# Patient Record
Sex: FEMALE | Employment: UNEMPLOYED
[De-identification: names, ages, dates, MRNs, and addresses within clinical notes are randomized per-mention and may not be internally consistent; named-entity substitution may affect disease eponyms.]

---

## 2022-04-14 ENCOUNTER — NURSE TRIAGE (OUTPATIENT)
Dept: OTHER | Facility: CLINIC | Age: 52
End: 2022-04-14

## 2022-04-14 NOTE — TELEPHONE ENCOUNTER
Received call from Kevin Gage at [facility] with Red Flag Complaint. Subjective: Caller states \"I had a tick bite on my right ear and now I am having symptoms that I think are related. Two weeks ago my right heel was giving me problems to where I had difficulty walking. My knees are twice the size and my elbows are the same. My lymph nodes in my neck have been swollen for about a week. My joints are very painful. My shoulders, wrist, hands, and fingers are very sore. \"     Current Symptoms: swollen and painful joints, swollen lymph nodes    Onset: 2 weeks ago; worsening    Associated Symptoms: NA    Pain Severity: 10/10; sharp; constant    Temperature: none    What has been tried: Tylenol helped a little    LMP: NA Pregnant: NA    Recommended disposition: See in Office Today or Tomorrow    Care advice provided, patient verbalizes understanding; denies any other questions or concerns; instructed to call back for any new or worsening symptoms. Patient/Caller agrees with recommended disposition; writer provided warm transfer to [employer] [facility] at [facility] for appointment scheduling    Attention Provider: Thank you for allowing me to participate in the care of your patient. The patient was connected to triage in response to information provided to the [facility]. Please do not respond through this encounter as the response is not directed to a shared pool.         Reason for Disposition   Red ring or bull's-eye rash occurs around a deer tick bite    Protocols used: TICK BITE-ADULT-[place]

## 2022-04-27 ENCOUNTER — OFFICE VISIT (OUTPATIENT)
Dept: FAMILY MEDICINE CLINIC | Age: 52
End: 2022-04-27
Payer: MEDICAID

## 2022-04-27 ENCOUNTER — TELEPHONE (OUTPATIENT)
Dept: FAMILY MEDICINE CLINIC | Age: 52
End: 2022-04-27

## 2022-04-27 VITALS
WEIGHT: 185.2 LBS | BODY MASS INDEX: 25.93 KG/M2 | RESPIRATION RATE: 20 BRPM | DIASTOLIC BLOOD PRESSURE: 106 MMHG | SYSTOLIC BLOOD PRESSURE: 153 MMHG | HEART RATE: 80 BPM | HEIGHT: 71 IN | OXYGEN SATURATION: 98 % | TEMPERATURE: 98.6 F

## 2022-04-27 DIAGNOSIS — R03.0 ELEVATED BLOOD-PRESSURE READING WITHOUT DIAGNOSIS OF HYPERTENSION: ICD-10-CM

## 2022-04-27 DIAGNOSIS — R60.0 LOCALIZED EDEMA: ICD-10-CM

## 2022-04-27 DIAGNOSIS — M79.10 MYALGIA: Primary | ICD-10-CM
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
## 2022-04-27 PROCEDURE — 99204 OFFICE O/P NEW MOD 45 MIN: CPT | Performed by: FAMILY MEDICINE

## 2022-04-27 RX ORDER — CHOLECALCIFEROL (VITAMIN D3) 50 MCG
1200 CAPSULE ORAL
COMMUNITY

## 2022-04-27 RX ORDER — DOXYCYCLINE 100 MG/1
100 TABLET ORAL 2 TIMES DAILY
Qty: 28 TABLET | Refills: 0 | Status: SHIPPED | OUTPATIENT
Start: 2022-04-27 | End: 2022-05-11

## 2022-04-27 RX ORDER — GLUCOSAMINE/CHONDR SU A SOD 167-133 MG
500 CAPSULE ORAL
COMMUNITY

## 2022-04-27 NOTE — PROGRESS NOTES
[facility]    History of Present Illness:   Kaitlin Ramos is a 54 y.o. female here for   Chief Complaint   Patient presents with   [facility] Establish Care     joint pain, joint swelling, and numbness in finger tips. Want to be tested for lime disease. Couldn't walk or bend arms on April 14. Have been bite by hundreds of ticks in the past.         HPI:  Seen at [facility] last month. C/o persistent R heel pain for a while then on April 14 she woke up and her knees were swollen as well as elbows, wrist, hand, all swollen. Fingers numb as well. .  All joint were hurting last week so she started taking amox she had at home. Initially 1000mg bid x 7 days, then decreased to 500 mg bid x 4 days. She said she actually felt better on the antibiotics. Very active previously, a hiker. H/o tick bites. Recent as well as remote. She would like to be tested for lymes' disease. Health Maintenance  Health Maintenance Due   Topic Date Due    Hepatitis C Screening  Never done    COVID-19 Vaccine (1) Never done    Pneumococcal 0-64 years (1 - PCV) Never done    DTaP/Tdap/Td series (1 - Tdap) Never done    Cervical cancer screen  Never done    Lipid Screen  Never done    Colorectal Cancer Screening Combo  Never done       Past Medical, Family, and Social History:     Past Medical History:   Diagnosis Date    Hypercholesterolemia     Hypertension       Current Outpatient Medications on File Prior to Visit   Medication Sig Dispense Refill    nicotinic acid (NIACIN) 500 mg tablet Take 500 mg by mouth Daily (before breakfast).  omega 3-dha-epa-fish oil (Fish OiL) 100-160-1,000 mg cap Take 1,200 mg by mouth. No current facility-administered medications on file prior to visit. There is no problem list on file for this patient.       Social History     Socioeconomic History    Marital status: SINGLE   Tobacco Use    Smoking status: Current Every Day Smoker    Smokeless tobacco: Never Used    Tobacco comment: 1/2 pack daily   Substance and Sexual Activity    Alcohol use: Not Currently    Drug use: Never    Sexual activity: Not Currently        Review of Systems   Review of Systems   Constitutional: Negative for chills and fever. Respiratory: Negative for cough and shortness of breath. Cardiovascular: Negative for chest pain. Gastrointestinal: Negative for abdominal pain. Musculoskeletal: Positive for joint pain and myalgias. Skin: Negative for rash. Neurological: Positive for tingling. Objective:     Visit Vitals  BP (!) 153/106 (BP 1 Location: Left upper arm, BP Patient Position: Sitting, BP Cuff Size: Adult)   Pulse 80   Temp 98.6 °F (37 °C) (Oral)   Resp 20   Ht 5' 11\" (1.803 m)   Wt 185 lb 3.2 oz (84 kg)   SpO2 98%   BMI 25.83 kg/m²        Physical Exam  PHYSICAL EXAM:  Gen: Pt sitting in chair, in NAD  Head: Normocephalic, atraumatic  Eyes: Sclera anicteric, EOM grossly intact,  Ears: TM's pearly with good light reflex b/l  CVS: Normal S1, S2, no m/r/g  Resp: CTAB, no wheezes or rales  Extrem: Atraumatic, no cyanosis, slight edema in hands  MSK; Full range of motion  R foot- tender to palpation on lateral aspect of heel; no lesions or rashes  Pulses: 2+   Skin: Warm, dry  Neuro: Alert, oriented, appropriate          Assessment and orders:       ICD-10-CM ICD-9-CM    1. Myalgia  M79.10 729.1 BABESIA MICROTI AB, IGG/IGM      EHRLICHIA DETECTION BY PCR      R RICKETTSII IGM      R RICKETTSII AB IGG W/REFL      LYME AB, IGG & IGM BY WB   2. Tick bite, unspecified site, initial encounter  W57. XXXA 919.4 BABESIA MICROTI AB, IGG/IGM     A953.2 EHRLICHIA DETECTION BY PCR      R RICKETTSII IGM      R RICKETTSII AB IGG W/REFL      LYME AB, IGG & IGM BY WB   3. Localized edema  R60.0 782.3 BABESIA MICROTI AB, IGG/IGM      EHRLICHIA DETECTION BY PCR      R RICKETTSII IGM      R RICKETTSII AB IGG W/REFL   4.  Elevated blood-pressure reading without diagnosis of hypertension  R03.0 796.2 Diagnoses and all orders for this visit:    1. Myalgia  -     BABESIA MICROTI AB, IGG/IGM; Future  -     EHRLICHIA DETECTION BY PCR; Future  -     R RICKETTSII IGM; Future  -     R RICKETTSII AB IGG W/REFL; Future    2. Tick bite, unspecified site, initial encounter  -     BABESIA MICROTI AB, IGG/IGM; Future  -     EHRLICHIA DETECTION BY PCR; Future  -     R RICKETTSII IGM; Future  -     R RICKETTSII AB IGG W/REFL; Future    -     doxycycline (ADOXA) 100 mg tablet; Take 1 Tablet by mouth two (2) times a day for 14 days. Will presumptively cover for both Lyme's and [facility]. Patient wanted to take lab order to outside facility to make sure that they are covered by her insurance. I explained the limitations especially with Lyme's testing. 3. Localized edema  -     BABESIA MICROTI AB, IGG/IGM; Future  -     EHRLICHIA DETECTION BY PCR; Future  -     R RICKETTSII IGM; Future  -     R RICKETTSII AB IGG W/REFL; Future    4. Elevated blood-pressure reading without diagnosis of hypertension  She says her bp at home ranges in 150s but she refuses any meds. I discussed the long-term consequences of hypertension untreated including heart attack and stroke. I advised reduction of dietary salt intake, DASH diet, and exercise daily. She will monitor blood pressure at home and bring readings back with her in 2 weeks. If no improvement with antibiotics, I advised testing for rheum diseases. Patient signed for medical records today as she has had some labs done but is unsure what. Follow-up and Dispositions    · Return in about 2 weeks (around 5/11/2022), or if symptoms worsen or fail to improve, for Hypertension. I very strongly urged to quit smoking to reduce cardiovascular risk  I reviewed medications and side effects in detail--I discussed the risk of antibiotics including resistance as well as C. difficile. Advised to only take them when prescribed.     I have discussed the diagnosis with the patient and the intended plan as seen in the above orders. Social history, medical history, and labs were reviewed. The patient has received an after-visit summary and questions were answered concerning future plans. I have discussed medication side effects and warnings with the patient as well. Patient/guardian verbalized understanding and accepts plan & risks.      MD KSENIA Chino & [facility]  04/27/22

## 2022-04-27 NOTE — PATIENT INSTRUCTIONS
Plantar Fasciitis: Exercises  Introduction  Here are some examples of exercises for you to try. The exercises may be suggested for a condition or for rehabilitation. Start each exercise slowly. Ease off the exercises if you start to have pain. You will be told when to start these exercises and which ones will work best for you. How to do the exercises  Towel stretch    1. Sit with your legs extended and knees straight. 2. Place a towel around your foot just under the toes. 3. Hold each end of the towel in each hand, with your hands above your knees. 4. Pull back with the towel so that your foot stretches toward you. 5. Hold the position for at least 15 to 30 seconds. 6. Repeat 2 to 4 times a session, up to 5 sessions a day. Calf stretch    This exercise stretches the muscles at the back of the lower leg (the calf) and the Achilles tendon. Do this exercise 3 or 4 times a day, 5 days a week. 1. Stand facing a wall with your hands on the wall at about eye level. Put the leg you want to stretch about a step behind your other leg. 2. Keeping your back heel on the floor, bend your front knee until you feel a stretch in the back leg. 3. Hold the stretch for 15 to 30 seconds. Repeat 2 to 4 times. Plantar fascia and calf stretch    Stretching the plantar fascia and calf muscles can increase flexibility and decrease heel pain. You can do this exercise several times each day and before and after activity. 1. Stand on a step as shown above. Be sure to hold on to the banister. 2. Slowly let your heels down over the edge of the step as you relax your calf muscles. You should feel a gentle stretch across the bottom of your foot and up the back of your leg to your knee. 3. Hold the stretch about 15 to 30 seconds, and then tighten your calf muscle a little to bring your heel back up to the level of the step. Repeat 2 to 4 times.   Towel curls    Make this exercise more challenging by placing a weighted object, such as a soup can, on the other end of the towel. 1. While sitting, place your foot on a towel on the floor and scrunch the towel toward you with your toes. 2. Then, also using your toes, push the towel away from you. [place] pickups    1. Put marbles on the floor next to a cup.  2. Using your toes, try to lift the marbles up from the floor and put them in the cup. Follow-up care is a key part of your treatment and safety. Be sure to make and go to all appointments, and call your doctor if you are having problems. It's also a good idea to know your test results and keep a list of the medicines you take. Where can you learn more? Go to http://www.gray.com/  Enter [id number] in the search box to learn more about \"Plantar Fasciitis: Exercises. \"  Current as of: July 1, 2021               Content Version: 13.2  © 2006-2022 [employer]. Care instructions adapted under license by [employer] (which disclaims liability or warranty for this information). If you have questions about a medical condition or this instruction, always ask your healthcare professional. [address] any warranty or liability for your use of this information.

## 2022-04-27 NOTE — TELEPHONE ENCOUNTER
Pt states that her insurance [facility]) needs to be authorized for labs ordered to day. Please send orders to [facility].

## 2022-04-27 NOTE — PROGRESS NOTES
1. \"Have you been to the ER, urgent care clinic since your last visit? Hospitalized since your last visit? \" No    2. \"Have you seen or consulted any other health care providers outside of the [address] [place] [address] since your last visit? \" No     3. For patients aged 39-70: Has the patient had a colonoscopy / FIT/ Cologuard? No      If the patient is female:    4. For patients aged 41-77: Has the patient had a mammogram within the past 2 years? No      5. For patients aged 21-65: Has the patient had a pap smear?  No    Health Maintenance Due   Topic Date Due    Hepatitis C Screening  Never done    Depression Screen  Never done    COVID-19 Vaccine (1) Never done    DTaP/Tdap/Td series (1 - Tdap) Never done    Cervical cancer screen  Never done    Lipid Screen  Never done    Colorectal Cancer Screening Combo  Never done

## 2022-04-29 PROBLEM — E78.00 PURE HYPERCHOLESTEROLEMIA: Status: ACTIVE | Noted: 2022-04-29

## 2022-05-02 ENCOUNTER — TELEPHONE (OUTPATIENT)
Dept: FAMILY MEDICINE CLINIC | Age: 52
End: 2022-05-02

## 2022-05-02 NOTE — TELEPHONE ENCOUNTER
Spoke with patient regarding lab orders. She will call back with her case workers info. so I can see what they need from the provider.

## 2022-05-02 NOTE — TELEPHONE ENCOUNTER
[facility] in regards to lab work. They advised me that patient would have to submit office notes sometimes for lyme disease testing just to insure  That insurance covers it. Patient notified of this and decided that she doesn't want the test done for fear of receiving a bill if they don't approve of it.

## 2022-05-03 ENCOUNTER — TELEPHONE (OUTPATIENT)
Dept: FAMILY MEDICINE CLINIC | Age: 52
End: 2022-05-03

## 2022-05-03 NOTE — TELEPHONE ENCOUNTER
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 They want her to go thru the Portal to get everything done. Please call Pt. They need this for the Pre-Auth. May 11th is close and they need to have this done.

## 2022-05-04 NOTE — TELEPHONE ENCOUNTER
Called and spoke with pt's insurance regarding lyme testing. Pre-auth form filled out and faxed with notes to [employer]. Pt made aware that forms were filled out and faxed to [facility] and made aware that the turn around time is 10-14 business days before she may hear something from her insurance.

## 2022-05-06 ENCOUNTER — TELEPHONE (OUTPATIENT)
Dept: FAMILY MEDICINE CLINIC | Age: 52
End: 2022-05-06

## 2022-05-11 ENCOUNTER — OFFICE VISIT (OUTPATIENT)
Dept: FAMILY MEDICINE CLINIC | Age: 52
End: 2022-05-11

## 2022-05-12 ENCOUNTER — TELEPHONE (OUTPATIENT)
Dept: FAMILY MEDICINE CLINIC | Age: 52
End: 2022-05-12

## 2022-05-12 NOTE — TELEPHONE ENCOUNTER
Please contact Pt. About testing and insurance. Pt is upset about someone canceling her appt. Please advise.

## 2022-05-13 NOTE — TELEPHONE ENCOUNTER
Faxed information to [facility] again as requested to fax number given. Called patient back in regards to her concerns about her appointment being cancelled on 5/11/202. I spoke with patient multiple times regarding lab order for lyme disease not being covered by her insurance. The day that we spoke inially was 4/27/2022 regarding the code for the lyme disease being approved by [facility]. She was worried about getting a bill from us seeing her as well. She asked that her appointment to be cancelled for May 11 because of all the back and forth with the insurance. She is now stating that she was calling to cancel her about for the May 11 but was told it was always cancelled. Tried to explain to her that she asked for it to be cancelled when we spoke previously. Patient also upset stating that we hadn't submitted paperwork for her lyme disease test to [facility] which is in the chart. I called to follow up on yesterday and was unsuccessful. Patient advised that would try to reach out to them again today.

## 2022-05-18 ENCOUNTER — TELEPHONE (OUTPATIENT)
Dept: FAMILY MEDICINE CLINIC | Age: 52
End: 2022-05-18

## 2022-05-18 NOTE — TELEPHONE ENCOUNTER
Please resubmit paperwork to [facility] with correct procedure codes for the tick bit.

## 2022-05-18 NOTE — TELEPHONE ENCOUNTER
VM left for [facility] to call the office at the number provide in the message regarding tick bite code for lab work.

## 2022-08-12 ENCOUNTER — TELEPHONE (OUTPATIENT)
Dept: FAMILY MEDICINE CLINIC | Age: 52
End: 2022-08-12

## 2022-08-12 NOTE — TELEPHONE ENCOUNTER
Pt returned call. Please call back. [phone] (home)  Pt will try to wait on call. If not can you leave the information on the voicemail.  Thanks

## 2022-08-12 NOTE — TELEPHONE ENCOUNTER
Left VM letting patient know she had lab orders that have not been completed. To call the office to schedule lab appt. Pt returned call. Please call back. [phone] (home)  Pt will try to wait on call. If not can you leave the information on the voicemail.  Thanks

## 2022-08-15 ENCOUNTER — PATIENT MESSAGE (OUTPATIENT)
Dept: FAMILY MEDICINE CLINIC | Age: 52
End: 2022-08-15